# Patient Record
Sex: FEMALE | Race: WHITE | NOT HISPANIC OR LATINO | URBAN - METROPOLITAN AREA
[De-identification: names, ages, dates, MRNs, and addresses within clinical notes are randomized per-mention and may not be internally consistent; named-entity substitution may affect disease eponyms.]

---

## 2019-12-31 ENCOUNTER — COMMUNICATION - HEALTHEAST (OUTPATIENT)
Dept: SCHEDULING | Facility: CLINIC | Age: 24
End: 2019-12-31

## 2020-07-30 ENCOUNTER — COMMUNICATION - HEALTHEAST (OUTPATIENT)
Dept: FAMILY MEDICINE | Facility: CLINIC | Age: 25
End: 2020-07-30

## 2020-07-30 DIAGNOSIS — Z00.00 HEALTHCARE MAINTENANCE: ICD-10-CM

## 2020-07-31 ENCOUNTER — COMMUNICATION - HEALTHEAST (OUTPATIENT)
Dept: FAMILY MEDICINE | Facility: CLINIC | Age: 25
End: 2020-07-31

## 2020-07-31 ENCOUNTER — AMBULATORY - HEALTHEAST (OUTPATIENT)
Dept: NURSING | Facility: CLINIC | Age: 25
End: 2020-07-31

## 2020-07-31 DIAGNOSIS — Z71.84 TRAVEL ADVICE ENCOUNTER: ICD-10-CM

## 2020-08-13 ENCOUNTER — AMBULATORY - HEALTHEAST (OUTPATIENT)
Dept: FAMILY MEDICINE | Facility: CLINIC | Age: 25
End: 2020-08-13

## 2020-08-13 DIAGNOSIS — Z71.84 TRAVEL ADVICE ENCOUNTER: ICD-10-CM

## 2020-08-15 ENCOUNTER — COMMUNICATION - HEALTHEAST (OUTPATIENT)
Dept: SCHEDULING | Facility: CLINIC | Age: 25
End: 2020-08-15

## 2021-06-04 NOTE — TELEPHONE ENCOUNTER
Kaelyn has an infection in left ear.  Started out like a pimple then over took her ear.  Kaelyn cannot hear out of ear.  Denies fever.  Had green drainage.   Mom will take Kaelyn to an urgent care.      Reason for Disposition    Yellow or green discharge    Protocols used: EAR - [id number]

## 2021-06-10 NOTE — TELEPHONE ENCOUNTER
Pt has nurse only appt 7/31. Appt details does not say reason. It appears she is due for 2nd hep A    Hep A, Adult IM (19yr & older)  1/12/2016 (20 y.o.)      Please sign order within this encounter, thanks!

## 2021-06-10 NOTE — TELEPHONE ENCOUNTER
Patient Returning Call  Reason for call:  Patient returning call   Information relayed to patient:    Frances Stephania YAZMIN, CMACertified Medical AssistantSigned  9:15 AM                Pt has nurse only appt 7/31. Appt details does not say reason. It appears she is due for 2nd hep A     Hep A, Adult IM (19yr & older)  1/12/2016 (20 y.o.)       Please sign order within this encounter, thanks!                     Patient has additional questions:  Yes  If YES, what are your questions/concerns:  Patient states she is scheduled for Tetanus boost injection not for Hep A .  Okay to leave a detailed message?: No

## 2021-07-03 NOTE — ADDENDUM NOTE
Addendum Note by Renee Johnson MD at 7/30/2020 12:21 PM     Author: Renee Johnson MD Service: -- Author Type: Physician    Filed: 7/30/2020 12:21 PM Encounter Date: 7/30/2020 Status: Signed    : Renee Johnson MD (Physician)    Addended by: RENEE JOHNSON on: 7/30/2020 12:21 PM        Modules accepted: Orders

## 2021-08-22 ENCOUNTER — HEALTH MAINTENANCE LETTER (OUTPATIENT)
Age: 26
End: 2021-08-22

## 2021-10-17 ENCOUNTER — HEALTH MAINTENANCE LETTER (OUTPATIENT)
Age: 26
End: 2021-10-17

## 2022-04-06 NOTE — PROGRESS NOTES
COVID-19 PCR test completed. Patient handout For Patients Who Have Been Tested for Covid-19 (Coronavirus) was given to the patient, which includes test result notification process.   Scheduled EGD with MAC on 4/06/2022 .  COVID TEST ON 4/04/2022. instructions given in clinic on 2/01/2022

## 2022-10-01 ENCOUNTER — HEALTH MAINTENANCE LETTER (OUTPATIENT)
Age: 27
End: 2022-10-01

## 2023-10-21 ENCOUNTER — HEALTH MAINTENANCE LETTER (OUTPATIENT)
Age: 28
End: 2023-10-21